# Patient Record
Sex: MALE | Race: WHITE
[De-identification: names, ages, dates, MRNs, and addresses within clinical notes are randomized per-mention and may not be internally consistent; named-entity substitution may affect disease eponyms.]

---

## 2021-04-21 ENCOUNTER — HOSPITAL ENCOUNTER (EMERGENCY)
Dept: HOSPITAL 8 - ED | Age: 61
Discharge: HOME | End: 2021-04-21
Payer: COMMERCIAL

## 2021-04-21 VITALS — WEIGHT: 172.18 LBS | BODY MASS INDEX: 25.5 KG/M2 | HEIGHT: 69 IN

## 2021-04-21 VITALS — DIASTOLIC BLOOD PRESSURE: 84 MMHG | SYSTOLIC BLOOD PRESSURE: 121 MMHG

## 2021-04-21 DIAGNOSIS — L03.114: Primary | ICD-10-CM

## 2021-04-21 PROCEDURE — 99284 EMERGENCY DEPT VISIT MOD MDM: CPT

## 2021-04-21 PROCEDURE — 96365 THER/PROPH/DIAG IV INF INIT: CPT

## 2021-04-21 NOTE — NUR
PATIENT WALKED BACK FROM [facility] WITH CHIEF C/O RIGHT HAND SWELLING.  PATIENT 
REPORTS CUTTING RIGHT HAND A WEEK AGO AND HAD SOME PUS DRAINED AT PCP OFFICE 
YESTERDAY AND WAS PLACED ON DOXYCYCLINE. PATIENT WOKE UP THIS MORNING AND HIS 
RIGHT HAND IS SWOLLEN.  



NADN, SMALL CLOSED WOUND ON PALM OF RIGHT HAND, CMS INTACT, CALL LIGHT WITHIN 
REACH.